# Patient Record
Sex: FEMALE
[De-identification: names, ages, dates, MRNs, and addresses within clinical notes are randomized per-mention and may not be internally consistent; named-entity substitution may affect disease eponyms.]

---

## 2024-01-03 ENCOUNTER — RX ONLY (OUTPATIENT)
Age: 34
Setting detail: RX ONLY
End: 2024-01-03

## 2024-01-03 ENCOUNTER — APPOINTMENT (RX ONLY)
Dept: URBAN - METROPOLITAN AREA CLINIC 86 | Facility: CLINIC | Age: 34
Setting detail: DERMATOLOGY
End: 2024-01-03

## 2024-01-03 DIAGNOSIS — Z41.9 ENCOUNTER FOR PROCEDURE FOR PURPOSES OTHER THAN REMEDYING HEALTH STATE, UNSPECIFIED: ICD-10-CM

## 2024-01-03 PROCEDURE — ? ADDITIONAL NOTES

## 2024-01-03 PROCEDURE — ? COSMETIC CONSULTATION: INMODE MORPHEUS 8

## 2024-01-03 PROCEDURE — ? COSMETIC CONSULTATION - MICRO-NEEDLING

## 2024-01-03 PROCEDURE — ? INVENTORY

## 2024-01-03 RX ORDER — HYDROQUINONE 6% 6 G/100G
EMULSION TOPICAL
Qty: 45 | Refills: 1 | Status: ERX | COMMUNITY
Start: 2024-01-03

## 2024-01-03 RX ORDER — TRETIONIN 0.25 MG/G
CREAM TOPICAL
Qty: 45 | Refills: 2 | Status: ERX | COMMUNITY
Start: 2024-01-03

## 2024-01-03 NOTE — PROCEDURE: ADDITIONAL NOTES
Render Risk Assessment In Note?: no
Additional Notes: Discussed using vitamin C in the morning and tretinoin + SLC at night.\\nWill send hydroquinone and tretinoin to local pharmacy per patient request. Offered specialty pharmacy [facility] if local is too expensive.
Detail Level: Simple

## 2024-01-03 NOTE — HPI: COSMETIC (MICRONEEDLING)
previous_has_had_a_previous_microneedling_treatment
When Was Your Last Treatment?: 2019
Additional History: Patient has concerns with texture and scarring.